# Patient Record
Sex: FEMALE | Race: WHITE | Employment: OTHER | URBAN - METROPOLITAN AREA
[De-identification: names, ages, dates, MRNs, and addresses within clinical notes are randomized per-mention and may not be internally consistent; named-entity substitution may affect disease eponyms.]

---

## 2018-07-17 ENCOUNTER — OFFICE VISIT (OUTPATIENT)
Dept: INFECTIOUS DISEASES | Age: 78
End: 2018-07-17
Payer: MEDICARE

## 2018-07-17 VITALS
TEMPERATURE: 97.6 F | DIASTOLIC BLOOD PRESSURE: 82 MMHG | SYSTOLIC BLOOD PRESSURE: 128 MMHG | BODY MASS INDEX: 29.95 KG/M2 | HEIGHT: 63 IN | WEIGHT: 169 LBS | HEART RATE: 109 BPM

## 2018-07-17 DIAGNOSIS — R53.81 DEBILITY: ICD-10-CM

## 2018-07-17 DIAGNOSIS — A41.81 ENTEROCOCCAL SEPTICEMIA (HCC): Primary | ICD-10-CM

## 2018-07-17 DIAGNOSIS — I33.0 ACUTE BACTERIAL ENDOCARDITIS: ICD-10-CM

## 2018-07-17 PROCEDURE — 99204 OFFICE O/P NEW MOD 45 MIN: CPT | Performed by: INTERNAL MEDICINE

## 2018-07-17 RX ORDER — GABAPENTIN 300 MG/1
300 CAPSULE ORAL 3 TIMES DAILY
COMMUNITY

## 2018-07-17 RX ORDER — LOPERAMIDE HYDROCHLORIDE 2 MG/1
2 CAPSULE ORAL 4 TIMES DAILY PRN
COMMUNITY

## 2018-07-17 RX ORDER — FAMOTIDINE 20 MG/1
20 TABLET, FILM COATED ORAL 2 TIMES DAILY
COMMUNITY

## 2018-07-17 RX ORDER — ALENDRONATE SODIUM 70 MG/1
70 TABLET ORAL
COMMUNITY

## 2018-07-17 RX ORDER — BUMETANIDE 1 MG/1
1 TABLET ORAL DAILY
COMMUNITY

## 2018-07-17 RX ORDER — LANOLIN ALCOHOL/MO/W.PET/CERES
1000 CREAM (GRAM) TOPICAL DAILY
COMMUNITY

## 2018-07-17 RX ORDER — DULOXETIN HYDROCHLORIDE 30 MG/1
30 CAPSULE, DELAYED RELEASE ORAL DAILY
COMMUNITY

## 2018-07-17 RX ORDER — LOSARTAN POTASSIUM 25 MG/1
25 TABLET ORAL DAILY
COMMUNITY

## 2018-07-17 RX ORDER — ACETAMINOPHEN 325 MG/1
650 TABLET ORAL EVERY 12 HOURS PRN
COMMUNITY

## 2018-07-17 RX ORDER — DILTIAZEM HYDROCHLORIDE 360 MG/1
360 CAPSULE, EXTENDED RELEASE ORAL DAILY
COMMUNITY

## 2018-07-17 RX ORDER — METOPROLOL SUCCINATE 100 MG/1
100 TABLET, EXTENDED RELEASE ORAL DAILY
COMMUNITY

## 2018-07-17 RX ORDER — LANOLIN ALCOHOL/MO/W.PET/CERES
3 CREAM (GRAM) TOPICAL DAILY
COMMUNITY

## 2018-07-17 RX ORDER — AMOXICILLIN 250 MG/1
250 CAPSULE ORAL 3 TIMES DAILY
Qty: 90 CAPSULE | Refills: 2 | Status: SHIPPED | OUTPATIENT
Start: 2018-07-17 | End: 2018-08-16

## 2018-07-17 RX ORDER — POTASSIUM CHLORIDE 1.5 G/1.77G
20 POWDER, FOR SOLUTION ORAL 2 TIMES DAILY
COMMUNITY

## 2018-07-17 RX ORDER — GLIMEPIRIDE 1 MG/1
1 TABLET ORAL
COMMUNITY

## 2018-07-17 RX ORDER — AMIODARONE HYDROCHLORIDE 200 MG/1
200 TABLET ORAL DAILY
COMMUNITY

## 2018-07-17 RX ORDER — PYRIDOXINE HCL (VITAMIN B6) 100 MG
TABLET ORAL
COMMUNITY

## 2018-07-17 RX ORDER — OXYCODONE AND ACETAMINOPHEN 10; 325 MG/1; MG/1
1 TABLET ORAL EVERY 4 HOURS PRN
COMMUNITY

## 2018-07-17 ASSESSMENT — ENCOUNTER SYMPTOMS
NAUSEA: 0
SORE THROAT: 0
RHINORRHEA: 0
EYE DISCHARGE: 0
FACIAL SWELLING: 0
VOICE CHANGE: 0
TROUBLE SWALLOWING: 0
COUGH: 0
DIARRHEA: 0
SHORTNESS OF BREATH: 0
ABDOMINAL PAIN: 0
ABDOMINAL DISTENTION: 0
BACK PAIN: 0

## 2018-07-17 NOTE — PROGRESS NOTES
Physical Exam   Constitutional: She is oriented to person, place, and time. She appears well-developed and well-nourished. Eyes: Right eye exhibits no discharge. Left eye exhibits no discharge. No scleral icterus. Neck: Neck supple. No JVD present. No tracheal deviation present. Cardiovascular: Normal rate, regular rhythm, normal heart sounds and intact distal pulses. No murmur heard. Pulmonary/Chest: Effort normal and breath sounds normal. No stridor. Abdominal: Soft. Bowel sounds are normal. She exhibits no distension. There is no tenderness. Musculoskeletal: She exhibits no edema. Lymphadenopathy:     She has no cervical adenopathy. Neurological: She is alert and oriented to person, place, and time. Labs:    No results found for any previous visit. }      [employer]       [facility]       [address] [place] [address]   Tel: [phone]         Fax: [phone]    [address] [place] [address]   Surgical Pathology Consultation         ADDENDUM ID    Patient Name: Marisela Martinez : 1940 (Age: 76) Gender: F Taken: 2018 Reported: 2018 Physician(s): Bowen Brenner DO ([phone]) Copy To:  Accession #: [id number] [facility]. Rec. #: [id number] Acct: # [de-identified]   Final Pathologic Diagnosis  1.  Antrum biopsy:         Fragments of gastric antral mucosa with chronic reactive gastropathy/chemical gastritis          Comment: Biopsies show mild eosinophilic inflammation in the lamina propria, and corkscrewing of the foveolar glands with smooth muscle extension into the superficial lamina propria. Reactive gastropathy can be associated with NSAID use, bile   reflux, and other drugs.  Immunostain for H. pylori organisms is pending and will follow in an addendum    2.  Descending colon, biopsy:         Fragmented superficial fragments of colonic mucosa with at least high grade dysplasia         Microbiology:  Blood culture   Order: [id number]

## 2018-09-06 ENCOUNTER — TELEPHONE (OUTPATIENT)
Dept: PULMONOLOGY | Age: 78
End: 2018-09-06

## 2018-09-06 ENCOUNTER — OFFICE VISIT (OUTPATIENT)
Dept: INFECTIOUS DISEASES | Age: 78
End: 2018-09-06

## 2018-09-06 VITALS
WEIGHT: 163 LBS | TEMPERATURE: 96.6 F | SYSTOLIC BLOOD PRESSURE: 113 MMHG | HEIGHT: 65 IN | HEART RATE: 92 BPM | DIASTOLIC BLOOD PRESSURE: 75 MMHG | BODY MASS INDEX: 27.16 KG/M2

## 2018-09-06 DIAGNOSIS — B95.2 ENTEROCOCCUS FAECALIS INFECTION: ICD-10-CM

## 2018-09-06 DIAGNOSIS — I33.0 SUBACUTE BACTERIAL ENDOCARDITIS: Primary | ICD-10-CM

## 2018-09-06 PROCEDURE — 99212 OFFICE O/P EST SF 10 MIN: CPT | Performed by: INTERNAL MEDICINE

## 2018-09-06 NOTE — LETTER
140s and she was quite confused. She was sent directly to the ER. She was started on amiodarone because of difficulty in controlling her heart rate. She was found to have enterococcal bacteremia. The isolate was sensitive to ampicillin, gentamicin, streptomycin, and vancomycin. Her blood cultures were positive on April 17, April 18 and also positive from the port on April 21. The infection was presumed to be coming from the port and the port was removed on  April 25. The patient was given ampicillin and vancomycin and she was discharged on ampicillin for 2 weeks. A transesophageal echocardiogram on April 24 did  Report an echodensity below the tricuspid valve but it was felt to be an artifact rather than a vegetation. Her blood cultures from May 1st and May 2nd came back negative. Patient was discharge to the [facility] and came home on May 11th with visiting nurse services. On May 21st while at home, she fell and struck her head sustaining a left parietal hematoma. Medical attention was not sought at that time. The patient had a single episode of vomiting and so family was quite concerned and brought her to the ER on May 31st.  Her white count was noted to be 31,000. The CT scan of her head was unremarkable and her neurologic examination appear to be at baseline. Blood cultures were drawn. It came back positive on May 29 with same organism that is Enterococcus. I was called about this patient at that time. I recommended another transesophageal echocardiogram which revealed an aortic valve density which could be a vegetation or calcification. Her LV function was fine at that time and she did not have significant regurgitation. The patient was placed on IV ceftriaxone as well as ampicillin because of the high risk for ototoxicity and nephrotoxicity with gentamicin. Patient did follow up with us on  July 17 she had a negative blood culture on July 13.  She also had a colonoscopy in July 27 with a General surgery.   The colonoscopy did not show any cancer.         She fell at home on 05/21 when she was walking at a [place] without her walker, and struck her head sustaining a left parietal hematoma.  Medical attention was not sought at the time. Kenia Kruse had a single episode of vomiting and so family was concerned and brought her into the ED on 05/31.  White count was noted to be 21,000. [facility] CT was unremarkable on neurologic exam at baseline.  Blood cultures were drawn. Kenia Kruse had no central lines PICC lines or ports.  Blood cultures 5/29 positive in 2 of 2 with same organism as April, repeat blood cultures May 31st showing clearance so far.   I discussed the situation with Dr. Molly Bradford infectious disease.  Transthoracic echocardiogram 6/1/18 could not rule out vegetation , had echodensity noted on the aortic valve possibly calcification, and she will require MAGNOLIA, though can be done outpatient. Kenia Kruse will require Rocephin 2 g IV q.12 hours plus ampicillin 2 g IV q.4 hours for presumed endocarditis.  Not using gentamicin due to risk of renal dysfunction with her advanced age, and data showing good result with above regimen.  Because q4h antibiotics cannot be delivered outside of ECF or hospital setting, she will need to be maintained an ECF until completion of this regimen.  Will require surveillance cultures after course of antibiotics finished.  I discussed all of this with her son per phone. Kenia Kruse will need to follow up outpatient with Infectious Disease Dr Molly Bradford after completion of IV antibiotics as she may require chronic ongoing suppressive antibiotic therapy.   CT abdomen was performed to workup source of recurrent enterococcal bacteremia.  CT showed duodenal abnormality of indeterminate etiology, recommend direct visualization.  Also in cecum had question of fecalith versus cecal mass.  She did not voice any abdominal complaints.    Dr Asha Ba was consulted and will be arranging outpatient EGD and colonoscopy.  (Her Eliquis will need to be temporarily held to achieve these.)     Last year she had spinal involvement with her multiple myeloma for which she underwent surgery chemo and radiation therapy with good result.  She had been placed on Percocet 10/320 240 tablets a month per Dr Frida Munson at the time. Alveda Sieving had been continued to be prescribed monthly per her MAPS. Zac Licona her daughter, who does help arrange the patient's pills, has had a known narcotic addiction and has been suspected of diverting her narcotics. Ramesh Evans son states that he has to hide the narcotics within the patient's house so that patient's daughter does not get them. [facility] in April stated the patient only takes maybe 1 tablet a day. Lane Sidra the patient's hallucinations confusion and dementia, I decreased her narcotics during her April hospitalization to [address] q.8 hours p.r.n. which controlled her pain well.  This dosage of [employer] was continued at her ECF. Jailyn Barreto was not prescribed additional narcotics at discharge from the ECF.  Her son showed me the paper prescription for Percocet on 04/16 that they had been given from Dr Frida Munson moments before, when I saw the patient and him in the ED.  It is quite concerning that they chose to go ahead and fill the Percocet prescription on 04/16 per her MAPS despite knowing that she would be admitted to the hospital and was not in fact discharged from an ECF until 5/11.       Ultimately family plans to have her placed in assisted living following her ECF stay.  In the future all medications, particularly controlled substances, should be administered by her assisted living, not her family.     Did have increased leg edema on 06/03 and her Bumex will be switched from every other day to daily.  Potassium supplement will be increased accordingly.       Review of Systems   Constitutional: Negative for activity change, appetite change, chills, diaphoresis, fatigue, fever and unexpected weight change. HENT: Negative for congestion, dental problem, drooling, facial swelling, hearing loss, mouth sores, nosebleeds, rhinorrhea, sinus pain, sinus pressure, sore throat, tinnitus, trouble swallowing and voice change. Eyes: Negative for discharge and visual disturbance. Respiratory: Positive for shortness of breath. Negative for cough. Cardiovascular: Negative for chest pain and leg swelling. Gastrointestinal: Negative for abdominal distention, abdominal pain, diarrhea and nausea. Genitourinary: Negative for dysuria, enuresis, frequency, genital sores, hematuria, menstrual problem, pelvic pain and urgency. Musculoskeletal: Negative for arthralgias, back pain, gait problem, joint swelling, myalgias, neck pain and neck stiffness. Skin: Negative for wound. Allergic/Immunologic: Negative for environmental allergies and food allergies. Neurological: Negative for dizziness, tremors, syncope, facial asymmetry, speech difficulty, weakness, light-headedness, numbness and headaches. Hematological: Negative for adenopathy. Does not bruise/bleed easily. Psychiatric/Behavioral: Negative for confusion.          Current Outpatient Prescriptions:     acetaminophen (TYLENOL) 325 MG tablet, Take 650 mg by mouth every 12 hours as needed for Pain, Disp: , Rfl:     amiodarone (CORDARONE) 200 MG tablet, Take 200 mg by mouth daily, Disp: , Rfl:     bumetanide (BUMEX) 1 MG tablet, Take 1 mg by mouth daily, Disp: , Rfl:     Calcium Carb-Cholecalciferol (CALCIUM 600 + D) 600-200 MG-UNIT TABS, Take by mouth, Disp: , Rfl:     diltiazem (CARDIZEM CD) 360 MG extended release capsule, Take 360 mg by mouth daily, Disp: , Rfl:     DULoxetine (CYMBALTA) 30 MG extended release capsule, Take 30 mg by mouth daily, Disp: , Rfl:     apixaban (ELIQUIS) 5 MG TABS tablet, Take by mouth 2 times daily, Disp: , Rfl:  CERVICAL SPINE SURGERY      CHOLECYSTECTOMY      COLONOSCOPY      HYSTERECTOMY      INTRACAPSULAR CATARACT EXTRACTION      KYPHOSIS SURGERY      OVARY REMOVAL      TONSILLECTOMY      VERTEBRAL CORPECTOMY         Allergies   Allergen Reactions    Ace Inhibitors Other (See Comments)    Atorvastatin Other (See Comments)    Rosuvastatin Other (See Comments)       Family History   Problem Relation Age of Onset    Diabetes Other     Obesity Other        Social History     Social History    Marital status:      Spouse name: N/A    Number of children: N/A    Years of education: N/A     Social History Main Topics    Smoking status: Never Smoker    Smokeless tobacco: Never Used    Alcohol use No    Drug use: No    Sexual activity: No     Other Topics Concern    None     Social History Narrative    None         Objective:   /75 (Site: Right Arm)   Pulse 92   Temp 96.6 °F (35.9 °C)   Ht 5' 5\" (1.651 m)   Wt 163 lb (73.9 kg)   BMI 27.12 kg/m²    Physical Exam   Constitutional: She is oriented to person, place, and time. She appears well-developed and well-nourished. No distress. Accompanied by  A friend   HENT:   Head: Normocephalic and atraumatic. Right Ear: External ear normal.   Left Ear: External ear normal.   Nose: Nose normal.   Mouth/Throat: Oropharynx is clear and moist. No oropharyngeal exudate. Eyes: Pupils are equal, round, and reactive to light. Conjunctivae and EOM are normal. Right eye exhibits discharge. Left eye exhibits discharge. Neck: Normal range of motion. Neck supple. No JVD present. No tracheal deviation present. No thyromegaly present. Cardiovascular: Normal rate, regular rhythm and intact distal pulses. Exam reveals no gallop and no friction rub. Murmur heard. Pulmonary/Chest: No stridor. She has no wheezes. She has no rales. Abdominal: Soft. Bowel sounds are normal. She exhibits no distension. There is no tenderness. There is no rebound. Musculoskeletal: She exhibits no edema, tenderness or deformity. Lymphadenopathy:     She has no cervical adenopathy. Neurological: She is alert and oriented to person, place, and time. She has normal reflexes. No cranial nerve deficit. Coordination normal.   Skin: Skin is warm and dry. No rash noted. She is not diaphoretic. No erythema. No pallor. Psychiatric: She has a normal mood and affect. Her behavior is normal. Judgment and thought content normal.              Data Review:      No visits with results within 2 Month(s) from this visit. Latest known visit with results is:   No results found for any previous visit. MICRO:  MAGNOLIA  PACS Images (images prior to 8/8/2017 are located in [facility] [employer])     Show images for Echo MAGNOLIA   Questions      Reason for exam? Endocarditis, Dr. Archuleta Morning to perform Monday, contact him regarding time      Interpretation Summary     · There are two vegetation seen on the aortic valve, one on the left coronary cusp which is highly mobile and sometimes prolapses through the aortic cusps. the other 1 is small and it is attached to the non coronary cusp. There is no perforation or abscess seen  · Moderate aortic valve regurgitation            TTE  PACS Images (images prior to 8/8/2017 are located in [facility])     Show images for Echo complete W/O contrast   Questions      Reason for exam? recurrent enterococcal bacteremia r/o vegetation      Interpretation Summary     · Normal left ventricular ejection fraction. · The estimated left ventricular ejection fraction is 55 - 60%. · Echodensity noted on the aortic valve possibly calcification. · Correlate clinically consider MAGNOLIA for further evaluation. IMAGING:  April 2018  Interpretation Summary     · Normal left ventricular ejection fraction. · The estimated left ventricular ejection fraction is 55 - 60%. · No clear evidence of valvular vegetation noted. · Echodensity noted below the tricuspid valve suspect is likely related to artifact doubt vegetation. Myocardial Findings     Left Ventricle Normal left ventricular end-diastolic dimension. The estimated left ventricular ejection fraction is 55 - 60%. Normal left ventricular ejection fraction. Right Ventricle Normal size right ventricle. Atrial Findings Left atrium is moderate to severely dilated. The left atrial appendage is visualized. No thrombus present in the left atrial appendage. Right atrium is moderately dilated. Aortic Valve The aortic valve is sclerotic but opens well. No aortic stenosis. Trace aortic valve regurgitation. Mitral Valve The mitral valve leaflets appear to be thickened. Mild mitral annular calcification. No mitral valve stenosis. Mild mitral regurgitation. Pulmonic Valve Normal pulmonic valve structure. Tricuspid Valve The tricuspid valve leaflets are thickened. Echodensity noted below the tricuspid valve suspect is likely related to artifact doubt vegetation. Pericardium No pericardial effusion. No pleural effusion. Study Information     Study Quality A 2D transesophageal echocardiogram with color flow Doppler was performed after obtaining informed consent. Image quality is fair. Probe was advanced without difficulty down the esophagus and digital image loops were obtained per protocol from the transesophageal and transgastric windows in multiple planes. Performed by: Nina Lizarraga        PACS Images (images prior to 8/8/2017 are located in [facility])         Thank you. Marah Cardenas MD  9/6/2018  11:34 AM    Infectious Disease Medicine    If you have questions, please do not hesitate to call me. I look forward to following Galilea Otoole along with you.     Sincerely,    MD Marah Tejada MD

## 2018-09-06 NOTE — PROGRESS NOTES
[facility] INFECTIOUS DISEASES  Follow-Up Note      Patient's Name: Ankit Dhillon  YOB: 1940  Age/Sex: [de-identified] y.o./ female  Physician: Eduardo Emery MD  Date of Consult: 9/6/2018          Assessment:     · Enterococcal sepsis with aortic valve endocarditis  ·  positive blood cultures on April 18 and April 21 and negative cultures on May 1st.  Relapse of the bacteremia on May 29. Repeat cultures negative 7/13  . Repeat blood cultures on August 10 came back negative  ·  History of multiple myeloma status post chemotherapy       Plan:     ·  Status post 6 weeks of IV ampicillin and ceftriaxone  · S/p Colonoscopy and polypectomy July 2018  ·  re-evaluation with the Cardiology and chest surgery. ·  She has new aortic regurgitation and this needs to be followed closely by chest surgery and Cardiology  ·  She may eventually require valve surgery. F/u of : aortic valve endocarditis  Chief Complaint   Patient presents with    Frequent Infections     Follow up Septicemia       Subjective: Interval History:    The patient is a 76 y.o. female  who was 1st admitted at [facility] in April 2018. She was managed by the hospitalist team there. The patient has a history of multiple myeloma. She was seeing Dr. Maria C Cardenas   On April 16 when  She was found to be tachycardic in atrial fibrillation with rapid ventricular response. Her heart rate was in the 140s and she was quite confused. She was sent directly to the ER. She was started on amiodarone because of difficulty in controlling her heart rate. She was found to have enterococcal bacteremia. The isolate was sensitive to ampicillin, gentamicin, streptomycin, and vancomycin. Her blood cultures were positive on April 17, April 18 and also positive from the port on April 21. The infection was presumed to be coming from the port and the port was removed on  April 25.  The patient was given ampicillin and vancomycin and she was he has to hide the narcotics within the patient's house so that patient's daughter does not get them. Rachael Page in April stated the patient only takes maybe 1 tablet a day. Toya Sink the patient's hallucinations confusion and dementia, I decreased her narcotics during her April hospitalization to [address] q.8 hours p.r.n. which controlled her pain well.  This dosage of Margarite Gaucher was continued at her ECF. Radha Ruiz was not prescribed additional narcotics at discharge from the [facility]. Her son showed me the paper prescription for Percocet on 04/16 that they had been given from Dr Akilah Rosa moments before, when I saw the patient and him in the ED.  It is quite concerning that they chose to go ahead and fill the Percocet prescription on 04/16 per her MAPS despite knowing that she would be admitted to the hospital and was not in fact discharged from an ECF until 5/11.       Ultimately family plans to have her placed in assisted living following her ECF stay.  In the future all medications, particularly controlled substances, should be administered by her assisted living, not her family.     Did have increased leg edema on 06/03 and her Bumex will be switched from every other day to daily.  Potassium supplement will be increased accordingly.       Review of Systems   Constitutional: Negative for activity change, appetite change, chills, diaphoresis, fatigue, fever and unexpected weight change. HENT: Negative for congestion, dental problem, drooling, facial swelling, hearing loss, mouth sores, nosebleeds, rhinorrhea, sinus pain, sinus pressure, sore throat, tinnitus, trouble swallowing and voice change. Eyes: Negative for discharge and visual disturbance. Respiratory: Positive for shortness of breath. Negative for cough. Cardiovascular: Negative for chest pain and leg swelling. Gastrointestinal: Negative for abdominal distention, abdominal pain, diarrhea and nausea.    Genitourinary: Negative for dysuria, enuresis, frequency, genital sores, hematuria, menstrual problem, pelvic pain and urgency. Musculoskeletal: Negative for arthralgias, back pain, gait problem, joint swelling, myalgias, neck pain and neck stiffness. Skin: Negative for wound. Allergic/Immunologic: Negative for environmental allergies and food allergies. Neurological: Negative for dizziness, tremors, syncope, facial asymmetry, speech difficulty, weakness, light-headedness, numbness and headaches. Hematological: Negative for adenopathy. Does not bruise/bleed easily. Psychiatric/Behavioral: Negative for confusion. Current Outpatient Prescriptions:     acetaminophen (TYLENOL) 325 MG tablet, Take 650 mg by mouth every 12 hours as needed for Pain, Disp: , Rfl:     amiodarone (CORDARONE) 200 MG tablet, Take 200 mg by mouth daily, Disp: , Rfl:     bumetanide (BUMEX) 1 MG tablet, Take 1 mg by mouth daily, Disp: , Rfl:     Calcium Carb-Cholecalciferol (CALCIUM 600 + D) 600-200 MG-UNIT TABS, Take by mouth, Disp: , Rfl:     diltiazem (CARDIZEM CD) 360 MG extended release capsule, Take 360 mg by mouth daily, Disp: , Rfl:     DULoxetine (CYMBALTA) 30 MG extended release capsule, Take 30 mg by mouth daily, Disp: , Rfl:     apixaban (ELIQUIS) 5 MG TABS tablet, Take by mouth 2 times daily, Disp: , Rfl:     gabapentin (NEURONTIN) 300 MG capsule, Take 300 mg by mouth 3 times daily. ., Disp: , Rfl:     glimepiride (AMARYL) 1 MG tablet, Take 1 mg by mouth every morning (before breakfast), Disp: , Rfl:     loperamide (IMODIUM) 2 MG capsule, Take 2 mg by mouth 4 times daily as needed for Diarrhea, Disp: , Rfl:     metoprolol succinate (TOPROL XL) 100 MG extended release tablet, Take 100 mg by mouth daily, Disp: , Rfl:     Magnesium Hydroxide (MILK OF MAGNESIA PO), Take by mouth, Disp: , Rfl:     oxyCODONE-acetaminophen (PERCOCET)  MG per tablet, Take 1 tablet by mouth every 4 hours as needed for Pain. ., Disp: , Rfl:     potassium chloride (KLOR-CON) 20 MEQ packet, Take 20 mEq by mouth 2 times daily, Disp: , Rfl:     vitamin B-12 (CYANOCOBALAMIN) 1000 MCG tablet, Take 1,000 mcg by mouth daily, Disp: , Rfl:     alendronate (FOSAMAX) 70 MG tablet, Take 70 mg by mouth every 7 days, Disp: , Rfl:     famotidine (PEPCID) 20 MG tablet, Take 20 mg by mouth 2 times daily, Disp: , Rfl:     losartan (COZAAR) 25 MG tablet, Take 25 mg by mouth daily, Disp: , Rfl:     melatonin 3 MG TABS tablet, Take 3 mg by mouth daily, Disp: , Rfl:     B Complex Vitamins (VITAMIN B COMPLEX PO), Take 1 tablet by mouth daily, Disp: , Rfl:     Past Medical History:   Diagnosis Date    Anxiety     Arrhythmia     Arthritis     Atrial fibrillation with RVR (HCC)     Back injury     Cataract     Cervical facet syndrome (HCC)     Chondrocalcinosis     KNEE    Degeneration of cervical intervertebral disc     Depression     Diabetes mellitus ([facility] [address]     GERD (gastroesophageal reflux disease)     Hyperlipidemia     Hypertension     Multiple myeloma ([facility] [address]     Obesity     Osteoarthritis     Osteoporosis     Pneumonia     Polymyalgia ([facility] [address]     Rotator cuff disorder     Vitamin D deficiency        Past Surgical History:   Procedure Laterality Date    APPENDECTOMY      CATHETER REMOVAL      CATHETER REMOVAL      CERVICAL SPINE SURGERY      CHOLECYSTECTOMY      COLONOSCOPY      HYSTERECTOMY      INTRACAPSULAR CATARACT EXTRACTION      KYPHOSIS SURGERY      OVARY REMOVAL      TONSILLECTOMY      VERTEBRAL CORPECTOMY         Allergies   Allergen Reactions    Ace Inhibitors Other (See Comments)    Atorvastatin Other (See Comments)    Rosuvastatin Other (See Comments)       Family History   Problem Relation Age of Onset    Diabetes Other     Obesity Other        Social History     Social History    Marital status:       Spouse name: N/A    Number of children: N/A    Years of education: N/A     Social History Main Topics    Smoking status: Never Smoker    Smokeless calcification. No mitral valve stenosis. Mild mitral regurgitation. Pulmonic Valve Normal pulmonic valve structure. Tricuspid Valve The tricuspid valve leaflets are thickened. Echodensity noted below the tricuspid valve suspect is likely related to artifact doubt vegetation. Pericardium No pericardial effusion. No pleural effusion. Study Information     Study Quality A 2D transesophageal echocardiogram with color flow Doppler was performed after obtaining informed consent. Image quality is fair. Probe was advanced without difficulty down the esophagus and digital image loops were obtained per protocol from the transesophageal and transgastric windows in multiple planes. Performed by: [employer]        PACS Images (images prior to 8/8/2017 are located in [place])         Thank you.     Gilles Saleem MD  9/6/2018  11:34 AM    Infectious Disease Medicine

## 2018-09-16 ASSESSMENT — ENCOUNTER SYMPTOMS
RHINORRHEA: 0
SORE THROAT: 0
SHORTNESS OF BREATH: 1
SINUS PRESSURE: 0
ABDOMINAL PAIN: 0
EYE DISCHARGE: 0
COUGH: 0
FACIAL SWELLING: 0
BACK PAIN: 0
TROUBLE SWALLOWING: 0
NAUSEA: 0
SINUS PAIN: 0
ABDOMINAL DISTENTION: 0
VOICE CHANGE: 0
DIARRHEA: 0

## 2018-12-03 ENCOUNTER — OFFICE VISIT (OUTPATIENT)
Dept: INFECTIOUS DISEASES | Age: 78
End: 2018-12-03

## 2018-12-03 VITALS
BODY MASS INDEX: 27.82 KG/M2 | TEMPERATURE: 96.9 F | SYSTOLIC BLOOD PRESSURE: 122 MMHG | DIASTOLIC BLOOD PRESSURE: 58 MMHG | HEIGHT: 65 IN | HEART RATE: 58 BPM | WEIGHT: 167 LBS

## 2018-12-03 DIAGNOSIS — N30.00 ACUTE CYSTITIS WITHOUT HEMATURIA: Primary | ICD-10-CM

## 2018-12-03 DIAGNOSIS — Z86.79 HX OF BACTERIAL ENDOCARDITIS: ICD-10-CM

## 2018-12-03 PROCEDURE — 99213 OFFICE O/P EST LOW 20 MIN: CPT | Performed by: INTERNAL MEDICINE

## 2018-12-03 RX ORDER — AMOXICILLIN 250 MG/1
500 CAPSULE ORAL 3 TIMES DAILY
Qty: 60 CAPSULE | Refills: 0 | Status: SHIPPED | OUTPATIENT
Start: 2018-12-03 | End: 2018-12-13

## 2018-12-09 ASSESSMENT — ENCOUNTER SYMPTOMS
EYES NEGATIVE: 1
GASTROINTESTINAL NEGATIVE: 1
RESPIRATORY NEGATIVE: 1
ALLERGIC/IMMUNOLOGIC NEGATIVE: 1

## 2018-12-13 DIAGNOSIS — N30.00 ACUTE CYSTITIS WITHOUT HEMATURIA: ICD-10-CM
